# Patient Record
Sex: FEMALE | Race: BLACK OR AFRICAN AMERICAN
[De-identification: names, ages, dates, MRNs, and addresses within clinical notes are randomized per-mention and may not be internally consistent; named-entity substitution may affect disease eponyms.]

---

## 2018-06-13 ENCOUNTER — HOSPITAL ENCOUNTER (OUTPATIENT)
Dept: HOSPITAL 74 - FMAMMOTONE | Age: 48
Discharge: HOME | End: 2018-06-13
Attending: SURGERY
Payer: COMMERCIAL

## 2018-06-13 DIAGNOSIS — R92.8: ICD-10-CM

## 2018-06-13 DIAGNOSIS — N64.89: ICD-10-CM

## 2018-06-13 DIAGNOSIS — N60.81: ICD-10-CM

## 2018-06-13 DIAGNOSIS — N60.11: Primary | ICD-10-CM

## 2018-06-13 PROCEDURE — 0HBT3ZX EXCISION OF RIGHT BREAST, PERCUTANEOUS APPROACH, DIAGNOSTIC: ICD-10-PCS | Performed by: SURGERY

## 2018-06-13 PROCEDURE — A4648 IMPLANTABLE TISSUE MARKER: HCPCS

## 2018-06-13 NOTE — OP
DATE OF OPERATION:  06/13/2018 

 

PREOPERATIVE DIAGNOSIS:  Abnormal right mammography.

 

POSTOPERATIVE DIAGNOSIS:  Abnormal right mammography.

 

PROCEDURE PERFORMED:  Right stereotactic needle biopsy with clips.

 

SURGEON:  Rachana Hawley MD

 

ANESTHESIA:  Local.

 

COMPLICATIONS:  None.

 

DISPOSITION:  Stable at the end of the procedure. 

 

INDICATIONS FOR PROCEDURE:  The patient underwent a routine screening mammography

that noted a cluster microcalcifications in the lower inner, slightly posterior right

breast.  The recommendation was needle biopsy.  The procedure was discussed with her.

 All of her questions were answered. 

 

DESCRIPTION OF PROCEDURE:  The patient was brought to [facility]

at [place], and taken into the stereo room, where she was placed on the [place]

table.  Using the medial approach, the calcifications in the lower inner right breast

were identified.  A sterile prep was obtained.  A target was chosen.  There was a

positive stroke margin.  Using Betadine and 1% lidocaine, a 10-gauge Suros device was

used to take several cores from this area.  The cores showed calcification within

them.  These were handled with the usual calcification protocol.  A clip was deployed

in the area.  Hemostasis was assured with direct pressure.  The incision was covered

with Steri-Strips. 

 

She tolerated the procedure well and left the breast imaging center in good

condition. 

 

 

RACHANA HAWLEY M.D.

 

[id number]

DD: 06/13/2018 12:32

DT: 06/13/2018 13:05

Job #:  [id number]

## 2018-06-14 NOTE — PATH
Surgical Pathology Report



Patient Name:  REY DUMONT

Accession #:  

[facility]. Rec. #:  [id number]                                                        

   /Age/Gender:  1970 (Age: 48) / F

Account:  [id number]                                                          

             Location: [facility]

Taken:  2018

Received:  2018

Reported:  2018

Physicians:  Rachana Jacobs M.D.

  



Specimen(s) Received

A: RIGHT BREAST SPECIMEN WITH CALCIFICATIONS 

B: RIGHT BREAST SPECIMEN WITHOUT CALCIFICATIONS 





Clinical History

Non-palpable lesion

Mammographic findings: Suspicious microcalcifications







Final Diagnosis

A. BREAST, RIGHT, WITH CALCIFICATIONS, STEREOTACTIC BIOPSY:

BENIGN BREAST TISSUE SHOWING FIBROCYSTIC CHANGES INCLUDING CYSTIC APOCRINE

METAPLASIA AND USUAL DUCTAL HYPERPLASIA (UDH) WITH CALCIFICATIONS IN ASSOCIATION

WITH CYST CONTENTS.



B. BREAST, RIGHT, WITHOUT CALCIFICATIONS, STEREOTACTIC BIOPSY:

BENIGN BREAST TISSUE SHOWING FIBROCYSTIC CHANGES INCLUDING CYSTIC APOCRINE

METAPLASIA AND USUAL DUCTAL HYPERPLASIA (UDH) WITH ASSOCIATED CALCIFICATIONS.





***Electronically Signed***

Estela Rockwell M.D.





Gross Description

A. Received in formalin labeled "right breast specimen with calcifications" are

multiple cores tan to yellow cylindrical portions of fibroadipose tissue ranging

from 0.5 to 2.8 cm in length and averaging 0.2 cm in diameter. The specimen is

entirely submitted in 2 cassettes.



B. Received in formalin labeled "right breast specimen without calcifications"

are multiple cores tan to yellow cylindrical portions of fibroadipose tissue

ranging from 0.5cm to 2.5 cm in length and averaging 0.2 cm in diameter. The

specimen is entirely submitted in 2 cassettes. 



Time to formalin fixation: 5 minutes

Total formalin fixation time: 9 to 10 hours

MOISÉS/2018



pam/2018